# Patient Record
Sex: MALE | Race: WHITE | Employment: FULL TIME | URBAN - METROPOLITAN AREA
[De-identification: names, ages, dates, MRNs, and addresses within clinical notes are randomized per-mention and may not be internally consistent; named-entity substitution may affect disease eponyms.]

---

## 2018-02-02 ENCOUNTER — HOSPITAL ENCOUNTER (OUTPATIENT)
Dept: CT IMAGING | Age: 43
Discharge: HOME | End: 2018-02-02
Attending: UROLOGY
Payer: COMMERCIAL

## 2018-02-02 DIAGNOSIS — N20.0 KIDNEY STONE: ICD-10-CM

## 2018-02-02 PROCEDURE — 74178 CT ABD&PLV WO CNTR FLWD CNTR: CPT

## 2018-02-02 PROCEDURE — 74011636320 HC RX REV CODE- 636/320: Performed by: UROLOGY

## 2018-02-02 RX ADMIN — IOPAMIDOL 100 ML: 755 INJECTION, SOLUTION INTRAVENOUS at 16:00

## 2018-03-16 RX ORDER — CEFAZOLIN SODIUM 2 G/50ML
2 SOLUTION INTRAVENOUS ONCE
Status: CANCELLED | OUTPATIENT
Start: 2018-03-16 | End: 2018-03-16

## 2018-03-18 ENCOUNTER — ANESTHESIA EVENT (OUTPATIENT)
Dept: SURGERY | Age: 43
End: 2018-03-18
Payer: COMMERCIAL

## 2018-03-19 ENCOUNTER — APPOINTMENT (OUTPATIENT)
Dept: GENERAL RADIOLOGY | Age: 43
End: 2018-03-19
Attending: UROLOGY
Payer: COMMERCIAL

## 2018-03-19 ENCOUNTER — HOSPITAL ENCOUNTER (OUTPATIENT)
Age: 43
Setting detail: OUTPATIENT SURGERY
Discharge: HOME | End: 2018-03-19
Attending: UROLOGY | Admitting: UROLOGY
Payer: COMMERCIAL

## 2018-03-19 ENCOUNTER — ANESTHESIA (OUTPATIENT)
Dept: SURGERY | Age: 43
End: 2018-03-19
Payer: COMMERCIAL

## 2018-03-19 VITALS
HEART RATE: 78 BPM | RESPIRATION RATE: 16 BRPM | WEIGHT: 290.2 LBS | HEIGHT: 73 IN | OXYGEN SATURATION: 92 % | BODY MASS INDEX: 38.46 KG/M2 | TEMPERATURE: 97.3 F | DIASTOLIC BLOOD PRESSURE: 85 MMHG | SYSTOLIC BLOOD PRESSURE: 125 MMHG

## 2018-03-19 PROCEDURE — C1769 GUIDE WIRE: HCPCS | Performed by: UROLOGY

## 2018-03-19 PROCEDURE — C1758 CATHETER, URETERAL: HCPCS | Performed by: UROLOGY

## 2018-03-19 PROCEDURE — 77030013079 HC BLNKT BAIR HGGR 3M -A: Performed by: ANESTHESIOLOGY

## 2018-03-19 PROCEDURE — 76010000161 HC OR TIME 1 TO 1.5 HR INTENSV-TIER 1: Performed by: UROLOGY

## 2018-03-19 PROCEDURE — 77030012961 HC IRR KT CYSTO/TUR ICUM -A: Performed by: UROLOGY

## 2018-03-19 PROCEDURE — 74011250637 HC RX REV CODE- 250/637

## 2018-03-19 PROCEDURE — 74018 RADEX ABDOMEN 1 VIEW: CPT

## 2018-03-19 PROCEDURE — C2617 STENT, NON-COR, TEM W/O DEL: HCPCS | Performed by: UROLOGY

## 2018-03-19 PROCEDURE — 74011250636 HC RX REV CODE- 250/636

## 2018-03-19 PROCEDURE — 77030010509 HC AIRWY LMA MSK TELE -A: Performed by: ANESTHESIOLOGY

## 2018-03-19 PROCEDURE — 77030018846 HC SOL IRR STRL H20 ICUM -A: Performed by: UROLOGY

## 2018-03-19 PROCEDURE — 74011250636 HC RX REV CODE- 250/636: Performed by: NURSE ANESTHETIST, CERTIFIED REGISTERED

## 2018-03-19 PROCEDURE — 77030018836 HC SOL IRR NACL ICUM -A: Performed by: UROLOGY

## 2018-03-19 PROCEDURE — 74011000250 HC RX REV CODE- 250

## 2018-03-19 PROCEDURE — 77030020782 HC GWN BAIR PAWS FLX 3M -B: Performed by: UROLOGY

## 2018-03-19 PROCEDURE — C1894 INTRO/SHEATH, NON-LASER: HCPCS | Performed by: UROLOGY

## 2018-03-19 PROCEDURE — 82360 CALCULUS ASSAY QUANT: CPT | Performed by: UROLOGY

## 2018-03-19 PROCEDURE — 76060000033 HC ANESTHESIA 1 TO 1.5 HR: Performed by: UROLOGY

## 2018-03-19 PROCEDURE — 76210000021 HC REC RM PH II 0.5 TO 1 HR: Performed by: UROLOGY

## 2018-03-19 PROCEDURE — 76210000006 HC OR PH I REC 0.5 TO 1 HR: Performed by: UROLOGY

## 2018-03-19 DEVICE — URETERAL STENT
Type: IMPLANTABLE DEVICE | Site: URETER | Status: FUNCTIONAL
Brand: CONTOUR™

## 2018-03-19 RX ORDER — FAMOTIDINE 20 MG/1
TABLET, FILM COATED ORAL
Status: COMPLETED
Start: 2018-03-19 | End: 2018-03-19

## 2018-03-19 RX ORDER — OXYCODONE AND ACETAMINOPHEN 5; 325 MG/1; MG/1
1-2 TABLET ORAL
Qty: 31 TAB | Refills: 0 | Status: SHIPPED | OUTPATIENT
Start: 2018-03-19 | End: 2018-05-03

## 2018-03-19 RX ORDER — INSULIN LISPRO 100 [IU]/ML
INJECTION, SOLUTION INTRAVENOUS; SUBCUTANEOUS ONCE
Status: DISCONTINUED | OUTPATIENT
Start: 2018-03-19 | End: 2018-03-19 | Stop reason: HOSPADM

## 2018-03-19 RX ORDER — LIDOCAINE HYDROCHLORIDE 20 MG/ML
INJECTION, SOLUTION EPIDURAL; INFILTRATION; INTRACAUDAL; PERINEURAL AS NEEDED
Status: DISCONTINUED | OUTPATIENT
Start: 2018-03-19 | End: 2018-03-19 | Stop reason: HOSPADM

## 2018-03-19 RX ORDER — SODIUM CHLORIDE, SODIUM LACTATE, POTASSIUM CHLORIDE, CALCIUM CHLORIDE 600; 310; 30; 20 MG/100ML; MG/100ML; MG/100ML; MG/100ML
75 INJECTION, SOLUTION INTRAVENOUS CONTINUOUS
Status: DISCONTINUED | OUTPATIENT
Start: 2018-03-19 | End: 2018-03-19 | Stop reason: HOSPADM

## 2018-03-19 RX ORDER — LIDOCAINE HYDROCHLORIDE 10 MG/ML
0.1 INJECTION, SOLUTION EPIDURAL; INFILTRATION; INTRACAUDAL; PERINEURAL AS NEEDED
Status: DISCONTINUED | OUTPATIENT
Start: 2018-03-19 | End: 2018-03-19 | Stop reason: HOSPADM

## 2018-03-19 RX ORDER — SODIUM CHLORIDE 0.9 % (FLUSH) 0.9 %
5-10 SYRINGE (ML) INJECTION AS NEEDED
Status: DISCONTINUED | OUTPATIENT
Start: 2018-03-19 | End: 2018-03-19 | Stop reason: HOSPADM

## 2018-03-19 RX ORDER — FENTANYL CITRATE 50 UG/ML
INJECTION, SOLUTION INTRAMUSCULAR; INTRAVENOUS AS NEEDED
Status: DISCONTINUED | OUTPATIENT
Start: 2018-03-19 | End: 2018-03-19 | Stop reason: HOSPADM

## 2018-03-19 RX ORDER — MAGNESIUM SULFATE 100 %
4 CRYSTALS MISCELLANEOUS AS NEEDED
Status: DISCONTINUED | OUTPATIENT
Start: 2018-03-19 | End: 2018-03-19 | Stop reason: HOSPADM

## 2018-03-19 RX ORDER — DEXTROSE 50 % IN WATER (D50W) INTRAVENOUS SYRINGE
25-50 AS NEEDED
Status: DISCONTINUED | OUTPATIENT
Start: 2018-03-19 | End: 2018-03-19 | Stop reason: HOSPADM

## 2018-03-19 RX ORDER — PROPOFOL 10 MG/ML
INJECTION, EMULSION INTRAVENOUS AS NEEDED
Status: DISCONTINUED | OUTPATIENT
Start: 2018-03-19 | End: 2018-03-19 | Stop reason: HOSPADM

## 2018-03-19 RX ORDER — ONDANSETRON 2 MG/ML
4 INJECTION INTRAMUSCULAR; INTRAVENOUS ONCE
Status: DISCONTINUED | OUTPATIENT
Start: 2018-03-19 | End: 2018-03-19 | Stop reason: HOSPADM

## 2018-03-19 RX ORDER — CIPROFLOXACIN 2 MG/ML
INJECTION, SOLUTION INTRAVENOUS AS NEEDED
Status: DISCONTINUED | OUTPATIENT
Start: 2018-03-19 | End: 2018-03-19 | Stop reason: HOSPADM

## 2018-03-19 RX ORDER — FENTANYL CITRATE 50 UG/ML
50 INJECTION, SOLUTION INTRAMUSCULAR; INTRAVENOUS
Status: DISCONTINUED | OUTPATIENT
Start: 2018-03-19 | End: 2018-03-19 | Stop reason: HOSPADM

## 2018-03-19 RX ORDER — SODIUM CHLORIDE 0.9 % (FLUSH) 0.9 %
5-10 SYRINGE (ML) INJECTION EVERY 8 HOURS
Status: DISCONTINUED | OUTPATIENT
Start: 2018-03-19 | End: 2018-03-19 | Stop reason: HOSPADM

## 2018-03-19 RX ORDER — MIDAZOLAM HYDROCHLORIDE 1 MG/ML
INJECTION, SOLUTION INTRAMUSCULAR; INTRAVENOUS AS NEEDED
Status: DISCONTINUED | OUTPATIENT
Start: 2018-03-19 | End: 2018-03-19 | Stop reason: HOSPADM

## 2018-03-19 RX ORDER — FAMOTIDINE 20 MG/1
20 TABLET, FILM COATED ORAL ONCE
Status: COMPLETED | OUTPATIENT
Start: 2018-03-19 | End: 2018-03-19

## 2018-03-19 RX ORDER — DOCUSATE SODIUM 100 MG/1
100 CAPSULE, LIQUID FILLED ORAL 2 TIMES DAILY
Qty: 40 CAP | Refills: 0 | Status: SHIPPED | OUTPATIENT
Start: 2018-03-19 | End: 2018-04-08

## 2018-03-19 RX ORDER — DEXAMETHASONE SODIUM PHOSPHATE 4 MG/ML
INJECTION, SOLUTION INTRA-ARTICULAR; INTRALESIONAL; INTRAMUSCULAR; INTRAVENOUS; SOFT TISSUE AS NEEDED
Status: DISCONTINUED | OUTPATIENT
Start: 2018-03-19 | End: 2018-03-19 | Stop reason: HOSPADM

## 2018-03-19 RX ORDER — ONDANSETRON 2 MG/ML
INJECTION INTRAMUSCULAR; INTRAVENOUS AS NEEDED
Status: DISCONTINUED | OUTPATIENT
Start: 2018-03-19 | End: 2018-03-19 | Stop reason: HOSPADM

## 2018-03-19 RX ORDER — CIPROFLOXACIN 2 MG/ML
400 INJECTION, SOLUTION INTRAVENOUS ONCE
Status: DISCONTINUED | OUTPATIENT
Start: 2018-03-19 | End: 2018-03-19 | Stop reason: HOSPADM

## 2018-03-19 RX ADMIN — FAMOTIDINE 20 MG: 20 TABLET, FILM COATED ORAL at 12:03

## 2018-03-19 RX ADMIN — CIPROFLOXACIN 400 MG: 2 INJECTION, SOLUTION INTRAVENOUS at 13:19

## 2018-03-19 RX ADMIN — ONDANSETRON 4 MG: 2 INJECTION INTRAMUSCULAR; INTRAVENOUS at 13:22

## 2018-03-19 RX ADMIN — LIDOCAINE HYDROCHLORIDE 40 MG: 20 INJECTION, SOLUTION EPIDURAL; INFILTRATION; INTRACAUDAL; PERINEURAL at 13:22

## 2018-03-19 RX ADMIN — FENTANYL CITRATE 50 MCG: 50 INJECTION, SOLUTION INTRAMUSCULAR; INTRAVENOUS at 13:50

## 2018-03-19 RX ADMIN — FENTANYL CITRATE 50 MCG: 50 INJECTION, SOLUTION INTRAMUSCULAR; INTRAVENOUS at 13:19

## 2018-03-19 RX ADMIN — DEXAMETHASONE SODIUM PHOSPHATE 4 MG: 4 INJECTION, SOLUTION INTRA-ARTICULAR; INTRALESIONAL; INTRAMUSCULAR; INTRAVENOUS; SOFT TISSUE at 13:22

## 2018-03-19 RX ADMIN — MIDAZOLAM HYDROCHLORIDE 2 MG: 1 INJECTION, SOLUTION INTRAMUSCULAR; INTRAVENOUS at 13:19

## 2018-03-19 RX ADMIN — PROPOFOL 200 MG: 10 INJECTION, EMULSION INTRAVENOUS at 13:22

## 2018-03-19 RX ADMIN — FENTANYL CITRATE 100 MCG: 50 INJECTION, SOLUTION INTRAMUSCULAR; INTRAVENOUS at 13:57

## 2018-03-19 RX ADMIN — SODIUM CHLORIDE, SODIUM LACTATE, POTASSIUM CHLORIDE, AND CALCIUM CHLORIDE 75 ML/HR: 600; 310; 30; 20 INJECTION, SOLUTION INTRAVENOUS at 12:03

## 2018-03-19 NOTE — ANESTHESIA POSTPROCEDURE EVALUATION
Post-Anesthesia Evaluation and Assessment    Patient: Mya Bowie MRN: [id number]  SSN: [id number]    YOB: 1975  Age: 38 y.o. Sex: male     VS from flow sheet    Cardiovascular Function/Vital Signs  Visit Vitals    /74    Pulse 72    Temp 36.5 °C (97.7 °F)    Resp 19    Ht 6' 1\" (1.854 m)    Wt 131.6 kg (290 lb 3.2 oz)    SpO2 95%    BMI 38.29 kg/m2       Patient is status post general anesthesia for Procedure(s):  CYSTOSCOPY/RIGHT URETEROSCOPY/100W HOLMIUM LASER LITHOTRIPSY/URETERAL STENT/C-ARM. Nausea/Vomiting: None    Postoperative hydration reviewed and adequate. Pain:  Pain Scale 1: Numeric (0 - 10) (03/19/18 1449)  Pain Intensity 1: 0 (03/19/18 1449)   Managed    Neurological Status:   Neuro (WDL): Within Defined Limits (03/19/18 1440)   At baseline    Mental Status and Level of Consciousness: Arousable    Pulmonary Status:   O2 Device: Room air (03/19/18 1447)   Adequate oxygenation and airway patent    Complications related to anesthesia: None    Post-anesthesia assessment completed.  No concerns    Signed By: Afshan Merida MD     March 19, 2018

## 2018-03-19 NOTE — DISCHARGE INSTRUCTIONS
Discharge Instructions      Patient: Audrey Red               Sex: male          DOA: 3/19/2018      YOB: 1975      Age:  38 y.o.        LOS:  LOS: 0 days     ACUTE DIAGNOSES:  Kidney stones [N20.0]    CHRONIC MEDICAL DIAGNOSES:  Problem List as of 3/19/2018  Date Reviewed: 3/19/2018          Codes Class Noted - Resolved    Kidney stone ICD-10-CM: N20.0  ICD-9-CM: 592.0  Unknown - Present    Overview Signed 3/9/2017 12:23 PM by Fina Brady MD     3/2017  24 hr urine   Low:  Vol, and citrate   High uric and sodium >>> calcium                   PROCEDURE:  Right Ureteroscopy, right ureteral stent placement    Instructions:  Please drink plenty of fluids to promote clear yellow urine. You may have blood in your urine which is normal.  A small amount of blood can cause all of the urine to look red. Please contact the office is you are passing large clots as this may be a sign of more serious bleeding. You have a stent in your ureter. This may cause flank or lower abdominal discomfort which is normal. You can take flomax to reduce this discomfort. You may resume all regular activity after 24 hours. You may remove your stent by gently pulling on the string 1 week after surgery. DISCHARGE MEDICATIONS:     · It is important that you take the medication exactly as they are prescribed. · Keep your medication in the bottles provided by the pharmacist and keep a list of the medication names, dosages, and times to be taken in your wallet. · Do not take other medications without consulting your doctor.        DIET:  Regular Diet    ACTIVITY: Activity as tolerated and no driving for today    ADDITIONAL INFORMATION: If you experience any of the following symptoms then please call your primary care physician or return to the emergency room if you cannot get hold of your doctor: Fever, chills, nausea, vomiting, diarrhea, change in mentation, falling, bleeding, shortness of breath. FOLLOW UP CARE:  Dr. Jamaica Son will see you in 6  Weeks in follow up. Office info:  [place]: [address]: [phone]           Information obtained by :  I understand that if any problems occur once I am at home I am to contact my physician. I understand and acknowledge receipt of the instructions indicated above. Physician's or R.N.'s Signature                                                                  Date/Time                                                                                                                                              Patient or Representative Signature                                                          Date/Time   Laxative, Stool Softeners (Doculax, Colace, Colace Clear, DSS) - (By mouth)   Why this medicine is used:   Treats constipation by helping you have a bowel movement. Contact a nurse or doctor right away if you have:  · Dark urine or pale stools  · Vomiting, loss of appetite, stomach pain  · Yellow skin or eyes     Common side effects:  · Nausea, diarrhea, stomach cramps, bitter taste in mouth  © 2017 [address] [employer] [address] [employer] [address] is for End User's use only and may not be sold, redistributed or otherwise used for commercial purposes. Narcotic-Analgesic/Acetaminophen (Percocet, Norco, Lorcet HD, Lortab 10/325) - (By mouth)   Why this medicine is used:   Relieves pain.   Contact a nurse or doctor right away if you have:  · Extreme weakness, shallow breathing, slow heartbeat  · Severe confusion, lightheadedness, dizziness, fainting  · Yellow skin or eyes, dark urine or pale stools  · Severe constipation, severe stomach pain, nausea, vomiting, loss of appetite  · Sweating or cold, clammy skin     Common side effects:  · Mild constipation, nausea, vomiting  · Sleepiness, tiredness  · Itching, rash  © 2017 [address] Information is for End User's use only and may not be sold, redistributed or otherwise used for commercial purposes. DISCHARGE SUMMARY from Nurse    PATIENT INSTRUCTIONS:    After general anesthesia or intravenous sedation, for 24 hours or while taking prescription Narcotics:  · Limit your activities  · Do not drive and operate hazardous machinery  · Do not make important personal or business decisions  · Do  not drink alcoholic beverages  · If you have not urinated within 8 hours after discharge, please contact your surgeon on call. Report the following to your surgeon:  · Excessive pain, swelling, redness or odor of or around the surgical area  · Temperature over 100.5  · Nausea and vomiting lasting longer than 4 hours or if unable to take medications  · Any signs of decreased circulation or nerve impairment to extremity: change in color, persistent  numbness, tingling, coldness or increase pain  · Any questions  *  Please give a list of your current medications to your Primary Care Provider. *  Please update this list whenever your medications are discontinued, doses are      changed, or new medications (including over-the-counter products) are added. *  Please carry medication information at all times in case of emergency situations. These are general instructions for a healthy lifestyle:    No smoking/ No tobacco products/ Avoid exposure to second hand smoke  Surgeon General's Warning:  Quitting smoking now greatly reduces serious risk to your health.     Obesity, smoking, and sedentary lifestyle greatly increases your risk for illness    A healthy diet, regular physical exercise & weight monitoring are important for maintaining a healthy lifestyle    You may be retaining fluid if you have a history of heart failure or if you experience any of the following symptoms:  Weight gain of 3 pounds or more overnight or 5 pounds in a week, increased swelling in our hands or feet or shortness of breath while lying flat in bed. Please call your doctor as soon as you notice any of these symptoms; do not wait until your next office visit. Recognize signs and symptoms of STROKE:    F-face looks uneven    A-arms unable to move or move unevenly    S-speech slurred or non-existent    T-time-call 911 as soon as signs and symptoms begin-DO NOT go       Back to bed or wait to see if you get better-TIME IS BRAIN. Warning Signs of HEART ATTACK     Call 911 if you have these symptoms:   Chest discomfort. Most heart attacks involve discomfort in the center of the chest that lasts more than a few minutes, or that goes away and comes back. It can feel like uncomfortable pressure, squeezing, fullness, or pain.  Discomfort in other areas of the upper body. Symptoms can include pain or discomfort in one or both arms, the back, neck, jaw, or stomach.  Shortness of breath with or without chest discomfort.  Other signs may include breaking out in a cold sweat, nausea, or lightheadedness. Don't wait more than five minutes to call 911 - MINUTES MATTER! Fast action can save your life. Calling 911 is almost always the fastest way to get lifesaving treatment. Emergency Medical Services staff can begin treatment when they arrive -- up to an hour sooner than if someone gets to the hospital by car. The discharge information has been reviewed with the patient and spouse. The patient and spouse verbalized understanding. Discharge medications reviewed with the patient and spouse and appropriate educational materials and side effects teaching were provided.   ___________________________________________________________________________________________________________________________________

## 2018-03-19 NOTE — ANESTHESIA PREPROCEDURE EVALUATION
Anesthetic History   No history of anesthetic complications            Review of Systems / Medical History  Patient summary reviewed and pertinent labs reviewed    Pulmonary  Within defined limits      Sleep apnea           Neuro/Psych   Within defined limits           Cardiovascular  Within defined limits                     GI/Hepatic/Renal                Endo/Other        Morbid obesity     Other Findings   Comments: Documentation of current medication  Current medications obtained, documented and obtained? YES      Risk Factors for Postoperative nausea/vomiting:       History of postoperative nausea/vomiting? NO       Female? NO       Motion sickness? NO       Intended opioid administration for postoperative analgesia? YES      Smoking Abstinence:  Current Smoker? NO  Elective Surgery? YES  Seen preoperatively by anesthesiologist or proxy prior to day of surgery? YES  Pt abstained from smoking 24 hours prior to anesthesia?  N/A    Preventive care/screening for High Blood Pressure:  Aged 18 years and older: YES  Screened for high blood pressure: YES  Patients with high blood pressure referred to primary care provider   for BP management: YES                 Physical Exam    Airway  Mallampati: III  TM Distance: 4 - 6 cm  Neck ROM: decreased range of motion   Mouth opening: Diminished (comment)     Cardiovascular    Rhythm: irregular  Rate: normal         Dental    Dentition: Poor dentition     Pulmonary  Breath sounds clear to auscultation               Abdominal  GI exam deferred       Other Findings            Anesthetic Plan    ASA: 3  Anesthesia type: general          Induction: Intravenous  Anesthetic plan and risks discussed with: Patient

## 2018-03-19 NOTE — IP AVS SNAPSHOT
[address] [place] [id number] 
[phone] Patient: Sabino Correia MRN: [id number] POX:[birth year] About your hospitalization You were admitted on:  March 19, 2018 You last received care in the:  [facility] PACU You were discharged on:  March 19, 2018 Why you were hospitalized Your primary diagnosis was:  Not on File Your diagnoses also included:  Kidney Stone Follow-up Information Follow up With Details Comments Contact Info Obie Case MD   [address] [facility] 
[phone] Carlos Raman MD  Follow up in 6 weeks [address] [facility] [address] [facility] 
[phone] Discharge Orders None A check sabino indicates which time of day the medication should be taken. My Medications START taking these medications Instructions Each Dose to Equal  
 Morning Noon Evening Bedtime  
 docusate sodium 100 mg capsule Commonly known as:  Gorham Zuniga Your last dose was: Your next dose is: Take 1 Cap by mouth two (2) times a day for 20 days. 100 mg  
    
   
   
   
  
 oxyCODONE-acetaminophen 5-325 mg per tablet Commonly known as:  PERCOCET Your last dose was: Your next dose is: Take 1-2 Tabs by mouth every four (4) hours as needed for Pain. Max Daily Amount: 12 Tabs. 1-2 Tab CONTINUE taking these medications Instructions Each Dose to Equal  
 Morning Noon Evening Bedtime  
 tamsulosin 0.4 mg capsule Commonly known as:  FLOMAX Your last dose was: Your next dose is: Take 1 Cap by mouth daily (after dinner). 0.4 mg Where to Get Your Medications Information on where to get these meds will be given to you by the nurse or doctor. ! Ask your nurse or doctor about these medications docusate sodium 100 mg capsule  
 oxyCODONE-acetaminophen 5-325 mg per tablet Discharge Instructions Discharge Instructions Patient: Mary Issa               Sex: male          DOA: 3/19/2018 YOB: 1975      Age:  38 y.o.        LOS:  LOS: 0 days ACUTE DIAGNOSES: 
Kidney stones [N20.0] CHRONIC MEDICAL DIAGNOSES: 
Problem List as of 3/19/2018  Date Reviewed: 3/19/2018 Codes Class Noted - Resolved Kidney stone ICD-10-CM: N20.0 ICD-9-CM: 592.0  Unknown - Present Overview Signed 3/9/2017 12:23 PM by Rick Jaeger MD  
  3/2017 
24 hr urine Low:  Vol, and citrate High uric and sodium >>> calcium PROCEDURE: 
Right Ureteroscopy, right ureteral stent placement Instructions: 
Please drink plenty of fluids to promote clear yellow urine. You may have blood in your urine which is normal.  A small amount of blood can cause all of the urine to look red. Please contact the office is you are passing large clots as this may be a sign of more serious bleeding. You have a stent in your ureter. This may cause flank or lower abdominal discomfort which is normal. You can take flomax to reduce this discomfort. You may resume all regular activity after 24 hours. You may remove your stent by gently pulling on the string 1 week after surgery. DISCHARGE MEDICATIONS:  
 
· It is important that you take the medication exactly as they are prescribed. · Keep your medication in the bottles provided by the pharmacist and keep a list of the medication names, dosages, and times to be taken in your wallet. · Do not take other medications without consulting your doctor. DIET:  Regular Diet ACTIVITY: Activity as tolerated and no driving for today ADDITIONAL INFORMATION: If you experience any of the following symptoms then please call your primary care physician or return to the emergency room if you cannot get hold of your doctor: Fever, chills, nausea, vomiting, diarrhea, change in mentation, falling, bleeding, shortness of breath. FOLLOW UP CARE: 
Dr. Allie Yip will see you in 6  Weeks in follow up. Office info: 
[facility]: [phone] [place]: [phone] Information obtained by : 
I understand that if any problems occur once I am at home I am to contact my physician. I understand and acknowledge receipt of the instructions indicated above. Physician's or R.N.'s Signature                                                                  Date/Time Patient or Representative Signature                                                          Date/Time Laxative, Stool Softeners (Doculax, Colace, Colace Clear, DSS) - (By mouth) Why this medicine is used:  
Treats constipation by helping you have a bowel movement. Contact a nurse or doctor right away if you have: · Dark urine or pale stools · Vomiting, loss of appetite, stomach pain · Yellow skin or eyes Common side effects: 
· Nausea, diarrhea, stomach cramps, bitter taste in mouth © 2017 [facility] Information is for End User's use only and may not be sold, redistributed or otherwise used for commercial purposes. Narcotic-Analgesic/Acetaminophen (Percocet, Norco, Lorcet HD, Lortab 10/325) - (By mouth) Why this medicine is used:  
Relieves pain. Contact a nurse or doctor right away if you have: 
· Extreme weakness, shallow breathing, slow heartbeat · Severe confusion, lightheadedness, dizziness, fainting · Yellow skin or eyes, dark urine or pale stools · Severe constipation, severe stomach pain, nausea, vomiting, loss of appetite · Sweating or cold, clammy skin Common side effects: · Mild constipation, nausea, vomiting · Sleepiness, tiredness · Itching, rash © 2017 [address] Information is for End User's use only and may not be sold, redistributed or otherwise used for commercial purposes. DISCHARGE SUMMARY from Nurse PATIENT INSTRUCTIONS: 
 
 
F-face looks uneven A-arms unable to move or move unevenly S-speech slurred or non-existent T-time-call 911 as soon as signs and symptoms begin-DO NOT go Back to bed or wait to see if you get better-TIME IS BRAIN. Warning Signs of HEART ATTACK Call 911 if you have these symptoms: 
? Chest discomfort. Most heart attacks involve discomfort in the center of the chest that lasts more than a few minutes, or that goes away and comes back. It can feel like uncomfortable pressure, squeezing, fullness, or pain. ? Discomfort in other areas of the upper body. Symptoms can include pain or discomfort in one or both arms, the back, neck, jaw, or stomach. ? Shortness of breath with or without chest discomfort. ? Other signs may include breaking out in a cold sweat, nausea, or lightheadedness. Don't wait more than five minutes to call [address]! Fast action can save your life. Calling 911 is almost always the fastest way to get lifesaving treatment. Emergency Medical Services staff can begin treatment when they arrive  up to an hour sooner than if someone gets to the hospital by car. The discharge information has been reviewed with the patient and spouse. The patient and spouse verbalized understanding. Discharge medications reviewed with the patient and spouse and appropriate educational materials and side effects teaching were provided.  
___________________________________________________________________________ ________________________________________________________ Introducing [facility]! [employer] introduces [employer] patient portal. Now you can access parts of your medical record, email your doctor's office, and request medication refills online. 1. In your internet browser, go to [email or web address] 2. Click on the First Time User? Click Here link in the Sign In box. You will see the New Member Sign Up page. 3. Enter your [employer] Access Code exactly as it appears below. You will not need to use this code after youve completed the sign-up process. If you do not sign up before the expiration date, you must request a new code. · [employer] Access Code: [email or web address] Expires: 4/25/2018  5:07 PM 
 
4. Enter the last four digits of your Social Security Number (xxxx) and Date of Birth (mm/dd/yyyy) as indicated and click Submit. You will be taken to the next sign-up page. 5. Create a [employer]abelverse ID. This will be your [employer]verse login ID and cannot be changed, so think of one that is secure and easy to remember. 6. Create a [employer] password. You can change your password at any time. 7. Enter your Password Reset Question and Answer. This can be used at a later time if you forget your password. 8. Enter your e-mail address. You will receive e-mail notification when new information is available in [address] 9. Click Sign Up. You can now view and download portions of your medical record. 10. Click the Download Summary menu link to download a portable copy of your medical information. If you have questions, please visit the Frequently Asked Questions section of the [employer] website. Remember, [employer]verse is NOT to be used for urgent needs. For medical emergencies, dial 911. Now available from your iPhone and Android! Unresulted Labs-Please follow up with your PCP about these lab tests Order Current Status [place] XR TECHNOLOGIST SERVICE In process XR ABD (STEPHANIE) In process Providers Seen During Your Hospitalization Provider Specialty Primary office phone Flip Berg MD Urology [phone] Your Primary Care Physician (PCP) Primary Care Physician Office Phone Office Fax ValeriaHeather Casetllon [phone] You are allergic to the following Allergen Reactions Penicillins Other (comments) Recent Documentation Height Weight BMI Smoking Status 1.854 m 131.6 kg 38.29 kg/m2 Never Smoker Emergency Contacts Name Discharge Info Relation Home Work Mobile Samantha Laguerre CAREGIVER [3] Spouse [3]  [phone] Patient Belongings The following personal items are in your possession at time of discharge: 
  Dental Appliances: None  Visual Aid: Glasses          Jewelry: None  Clothing: Undergarments, Pants, Shirt, Footwear    Other Valuables: Eyeglasses Please provide this summary of care documentation to your next provider. Signatures-by signing, you are acknowledging that this After Visit Summary has been reviewed with you and you have received a copy. Patient Signature:  ____________________________________________________________ Date:  ____________________________________________________________  
  
Keren Millan Provider Signature:  ____________________________________________________________ Date:  ____________________________________________________________

## 2018-03-19 NOTE — H&P
ASSESSMENT:   1. Right urolithiasis, gross hematuria    PLAN:    1. Right ureteroscopy, laser lithotripsy, right ureteral stent placement. CC: right urolithiasis    HISTORY OF PRESENT ILLNESS:  Harvey Patient is a 38 y.o. male who is seen preop. History of right urolithiasis. Noted on gross hematuria work up. No fevers, chills, nausea, vomiting. No new issues. AUA Symptom Score 1/8/2018   Over the past month how often have you had the sensation that your bladder was not completely empty after you finished urinating? 0   Over the past month, how often have had to urinate again less than 2 hours after you last finished urinating? 1   Over the past month, how often have you found you stopped and started again several times when you urinated? 0   Over the past month, how often have you found it difficult to postpone urination? 0   Over the past month, how often have you had a weak urinary stream? 0   Over the past month, how often have you had to push or strain to begin urinating? 0   Over the past month, how many times did you most typically get up to urinate from the time you went to bed at night until the time you got up in the morning? 1   AUA Score 2   If you were to spend the rest of your life with your urinary condition the way it is now, how would you feel about that? Delighted         Past Medical History:   Diagnosis Date    Kidney stone     Sleep apnea     cpap       Past Surgical History:   Procedure Laterality Date    HX VASECTOMY         Social History   Substance Use Topics    Smoking status: Never Smoker    Smokeless tobacco: Never Used    Alcohol use No       Allergies   Allergen Reactions    Penicillins Other (comments)       History reviewed. No pertinent family history.     Current Facility-Administered Medications   Medication Dose Route Frequency Provider Last Rate Last Dose    lidocaine (PF) (XYLOCAINE) 10 mg/mL (1 %) injection 0.1 mL  0.1 mL SubCUTAneous PRN Naomyjolly Nair CRNA        lactated Ringers infusion  75 mL/hr IntraVENous CONTINUOUS Naomy Nair CRNA 75 mL/hr at 03/19/18 1203 75 mL/hr at 03/19/18 1203    sodium chloride (NS) flush 5-10 mL  5-10 mL IntraVENous Q8H Naomy Nair CRNA        sodium chloride (NS) flush 5-10 mL  5-10 mL IntraVENous PRN Naomyjolly Nair CRNA        insulin lispro (HUMALOG) injection   SubCUTAneous ONCE Naomy Joanie, JOSI        ciprofloxacin (CIPRO) 400 mg IVPB (premix)  400 mg IntraVENous ONCE Lainey Stockton MD           Review of Systems  Pertinent findings noted in hpi. Otherwise negative review. PHYSICAL EXAMINATION:   Visit Vitals    /83    Pulse 89    Temp 98.4 °F (36.9 °C)    Resp 18    Ht 6' 1\" (1.854 m)    Wt 290 lb 3.2 oz (131.6 kg)    SpO2 96%    BMI 38.29 kg/m2     Constitutional: WDWN, Pleasant and appropriate affect, No acute distress. CV:  No peripheral swelling noted  Respiratory: No respiratory distress or difficulties  Abdomen:  No abdominal masses or tenderness. No CVA tenderness. No inguinal hernias noted. Skin: No jaundice. Neuro/Psych:  Alert and oriented x 3, affect appropriate. Lymphatic:   No enlarged inguinal lymph nodes.         REVIEW OF LABS AND IMAGING:    Results for orders placed or performed in visit on 03/05/18   URINE C&S PRE-OP   Result Value Ref Range    FINAL REPORT Microbiology results    AMB POC URINALYSIS DIP STICK AUTO W/O MICRO   Result Value Ref Range    Color (UA POC) Yellow     Clarity (UA POC) Clear     Glucose (UA POC) Negative Negative    Bilirubin (UA POC) Negative Negative    Ketones (UA POC) Trace Negative    Specific gravity (UA POC) 1.030 1.001 - 1.035    Blood (UA POC) 3+ Negative    pH (UA POC) 5.5 4.6 - 8.0    Protein (UA POC) Negative Negative    Urobilinogen (UA POC) 0.2 mg/dL 0.2 - 1    Nitrites (UA POC) Negative Negative    Leukocyte esterase (UA POC) Negative Negative       No results found for: PSA, PSA2, PSAR1, PSA1, PSAR2, PSA3, PSAR3, [id number], [id number], PSALT    CT Results:    Results from Hospital Encounter encounter on 02/02/18   CT UROGRAM W WO CONT   Narrative CT Urogram    Clinical information: Hematuria, gross     Comparison: None available. Technique: ? Axial CT of the abdomen and pelvis after administration of  intravenous contrast. Three-dimensional rendering and MIP reconstructions  performed on an independent workstation and submitted for evaluation. All CT  scans at this facility are performed using dose optimization technique as  appropriate to a performed exam, to include automated exposure control,  adjustment of the mA and/or kV according to patient size (including appropriate  matching for site-specific examinations), or use of iterative reconstruction  technique. Please note that lack of oral contrast limits interpretation of the  gastrointestinal tract and associated structures. Findings:       Urologic Findings:     Kidneys:  Symmetric nephrogram and excretion. Nonobstructive right intrarenal  nephroliths measure 2 x 5 mm medial interpolar, 6 x 10 mm posterior interpolar    Ureters: No hydroureteronephrosis. Please note that the distal ureters are not  opacified with contrast at the time of the scan. Urinary Bladder: Unremarkable for technique. Contrast pools at the dependent  urinary bladder. Non-Urologic Findings:     - Peritoneum: No pneumoperitoneum or ascites. - Liver: Hypodense parenchymal echotexture. - Bile ducts: No intrahepatic biliary dilatation. No extrahepatic biliary  dilatation.     - Gallbladder: Unremarkable for technique    - Pancreas: No pancreatic ductal dilatation. Unremarkable for technique    - Spleen: Unremarkable for technique    - Bowel: No pneumatosis or mesenteric venous gas. No obstruction. Appendix has  an unremarkable CT appearance for technique. Diverticulosis coli without  evidence of diverticulitis.       - Adrenals: Unremarkable for technique    - Retroperitoneal lymph nodes: No enlarged retroperitoneal lymph nodes evident  for technique. - Mesenteric lymph nodes: No enlarged mesenteric lymph nodes evident for  technique. - Osseous structures: Multilevel degenerative vertebral changes. - Lung bases: [Dependent atelectasis]. - Pelvic lymph nodes:  No enlarged pelvic lymph nodes evident for technique. - Vessels:  No significant calcific atherosclerosis of the abdominal aorta  noted. Focus of calcification at the posterior right common iliac artery. - Osseous structures:  Multilevel degenerative vertebral changes. - Other:    _______________          Impression Impression:     1. Nonobstructing nephroliths, otherwise no active or acute urinary system  finding to account for the patient's hematuria. 2. Right lower quadrant and left midabdomen mesenteric conspicuous lymph nodes  of unknown clinical significance. The lymph nodes here are not enlarged by  strict size criteria however they are conspicuous and larger than lymph nodes  elsewhere in the abdomen. The patient has an unremarkable appendix by CT  criteria. Unclear if the patient has a history of lymphoproliferative disorder  or findings that may suggest mesenteric adenitis. Please correlate clinically. 3. Hepatic steatosis. US Results:  No results found for this or any previous visit.       Theresa Cross MD   [facility]  [phone]

## 2018-03-19 NOTE — OP NOTES
Operative Note      Patient: Mya Bowie               Sex: male             MRN: [id number]      YOB: 1975      Age:  38 y.o. Preoperative Diagnosis: Kidney stones [N20.0]    Postoperative Diagnosis:  Kidney stones [N20.0]    Surgeon: Jl Stack    Anesthesia:  General    Procedure:    1) Cystoscopy  2) right Ureteroscopy  3) Laser lithotripsy  4) Basket removal of stone  5) right ureteral stent placement  6) < 1 hour physician fluoroscopy imaging interpretation time    Operative Indication: This is a 38 y.o. male with a history of right urolithiasis. Their stone burden included 10 mm lower pole and 5 mm midpole. After the risks, benefits, alternatives, and complications were discussed they present now for operative management. Procedure in Detail: The patient was brought to the operative suite. Anesthesia was induced and preoperative antibiotics were administered. They were then placed in the dorsal lithotomy position and their external genitalia was prepped and draped in the usual fashion. A surgical timeout was performed confirming the patient's name, date of birth, laterality, and antibiotics. All were in agreement. A 22 Amharic cystourethroscope was then inserted into the patients bladder. The urethra revealed no abnormalities. No bladder abnormalities were noted on cystoscopy. A sensor wire was then passed up the right ureteral orifice and up the kidney using fluoroscopic guidance. A semirigid ureteroscope was then passed up the right ureteral orifice and into the ureter. The ureter was then examined. The ureter was somewhat narrow. A amplatz superstiff wire was then passed through the ureteroscope and up to the kidney using fluoroscopic guidance. The ureteroscope was removed. A 11/13 fr access sheath was then passed over the superstiff wire and up to the kidney using fluroscopic guidance.   Due to narrowing within the ureter I was able to only the the sheath to the mid ureter. The inner stylet and wire were removed. A flexible ureteroscope was passed up to the kidney. Thorough pyeloscopy was performed. Stone burden was noted in the midpole approximately 5 mm and 10 mm in the lower pole. The laser was introduced into the kidney and the stone burden was laser fragmented. A basket was then used to remove the stone burden. The remainder of fragments were quite small and irrigated out of the calyx. The ureteroscope was used to examine the ureter in a retrograde fashion as the access sheath was removed. There was no evidence of ureteral perforation. A 6 fr x 28 cm stent was then placed in the standard fashion over our sensor wire. Excellent coil was noted in the kidney and bladder on fluoroscopy. The patient was then awoken and transferred to the recovery room in stable condition. Estimated Blood Loss: minimal                    Implants:   Implant Name Type Inv. Item Serial No.  Lot No. LRB No. Used Action   STENT URET FLX SFT [id number] -- CONTOUR PERCUFLEX - [id number]   STENT URET FLX SFT [id number] -- CONTOUR PERCUFLEX   [employer] [facility] [id number] Right 1 Implanted       Specimens:   ID Type Source Tests Collected by Time Destination   1 : right kidney stones Fresh Kidney, Right  Delonte Ojeda MD 3/19/2018  2:24 PM Pathology        Drains: None           Complications:  None           Counts: Sponge and needle counts were correct times two.     Delonte Ojeda MD  3/19/2018

## 2018-03-28 LAB
CA PHOS MFR STONE: 5 %
CALCIUM OXALATE DIHYDRATE MFR STONE IR: 10 %
COLOR STONE: NORMAL
COM MFR STONE: 85 %
COMMENT, 519994: NORMAL
COMPOSITION, 120440: NORMAL
DISCLAIMER, STO32L: NORMAL
NIDUS STONE QL: NORMAL
SIZE STONE: NORMAL MM
SURFACE CRYSTALS, 120439: NORMAL
WT STONE: 124 MG

## 2018-03-29 NOTE — PROGRESS NOTES
Please let pt know stone is calcium oxalate. Rec increasing uop 2.5 L/ day. Increase lemon juice intake to 4 oz per day to prevent kidney stones.     Darell Lockwood MD

## 2019-04-04 ENCOUNTER — CLINICAL SUPPORT (OUTPATIENT)
Dept: FAMILY MEDICINE CLINIC | Age: 44
End: 2019-04-04

## 2019-04-04 DIAGNOSIS — E66.9 OBESITY, UNSPECIFIED CLASSIFICATION, UNSPECIFIED OBESITY TYPE, UNSPECIFIED WHETHER SERIOUS COMORBIDITY PRESENT: Primary | ICD-10-CM

## 2019-04-04 NOTE — PROGRESS NOTES
Patient attended a Medically Supervised Weight Loss New Patient Orientation today where we discussed:  - New Direction Very Low Calorie Diet details  - Medical Supervision  - Nutrition education  - Cost of Meal Replacements  - Policies and compliance required for program enrollment. Patients initial consultation with physician is tentatively scheduled for:  Future Appointments   Date Time Provider Andrew Lopez   5/2/2019  4:00 PM MD Amador Her       Patient did not make appointment with [facility] provider after orientation. Patient is taking a new job and will call to schedule once job is established. Patient has 6 months to schedule or patient will need to attend orientation again.

## 2020-05-28 ENCOUNTER — HOSPITAL ENCOUNTER (OUTPATIENT)
Dept: GENERAL RADIOLOGY | Age: 45
Discharge: HOME | End: 2020-05-28
Payer: COMMERCIAL

## 2020-05-28 DIAGNOSIS — N20.0 KIDNEY STONE: ICD-10-CM

## 2020-05-28 PROCEDURE — 74018 RADEX ABDOMEN 1 VIEW: CPT

## 2020-06-04 ENCOUNTER — CLINICAL SUPPORT (OUTPATIENT)
Dept: SURGERY | Age: 45
End: 2020-06-04

## 2020-06-04 DIAGNOSIS — E66.9 OBESITY, UNSPECIFIED CLASSIFICATION, UNSPECIFIED OBESITY TYPE, UNSPECIFIED WHETHER SERIOUS COMORBIDITY PRESENT: Primary | ICD-10-CM

## 2020-06-04 NOTE — PROGRESS NOTES
Patient attended a Medically Supervised Weight Loss New Patient Orientation today where we discussed:  - New Direction Very Low Calorie Diet details  - Medical Supervision  - Nutrition education  - Cost of Meal Replacements  - Policies and compliance required for program enrollment. Patients initial consultation with provider is tentatively scheduled for:  Future Appointments   Date Time Provider Andrew Lopez   11/2/2020  3:30 PM Genaro Zamora NP [facility]       Patient attended orientation but did not stay to schedule initial consult. Reason: unknown Patient has 6 months from today to schedule initial consult or will need to repeat orientation.

## (undated) DEVICE — URETERAL ACCESS SHEATH SET: Brand: NAVIGATOR

## (undated) DEVICE — Z DUP USE 2565107 PACK SURG PROC LEG CYSTO T-DRAPE REINF TBL CVR HND TWL

## (undated) DEVICE — GDWIRE 3CM FLX-TIP 0.038X150CM -- BX/5 SENSOR

## (undated) DEVICE — OPEN-END FLEXI-TIP URETERAL CATHETER: Brand: FLEXI-TIP

## (undated) DEVICE — GAUZE SPONGES,16 PLY: Brand: CURITY

## (undated) DEVICE — GOWN,PREVENTION PLUS,XLN/XL,ST,24/CS: Brand: MEDLINE

## (undated) DEVICE — 3M™ BAIR PAWS FLEX™ WARMING GOWN, STANDARD, 20 PER CASE 81003: Brand: BAIR PAWS™

## (undated) DEVICE — GUIDEWIRE: Brand: AMPLATZ SUPER STIFF™

## (undated) DEVICE — STER SINGLE BASIN SET W/BOWLS: Brand: CARDINAL HEALTH

## (undated) DEVICE — TRAY PREP DRY W/ PREM GLV 2 APPL 6 SPNG 2 UNDPD 1 OVERWRAP

## (undated) DEVICE — Device

## (undated) DEVICE — BAG DRAINAGE CUST DISP

## (undated) DEVICE — LUB SURG MEDC STRL 2OZ TUBE MC -- MEDICHOICE

## (undated) DEVICE — KIT CLN UP BON SECOURS MARYV

## (undated) DEVICE — MEDI-VAC NON-CONDUCTIVE SUCTION TUBING: Brand: CARDINAL HEALTH

## (undated) DEVICE — (D)SYR 10ML 1/5ML GRAD NSAF -- PKGING CHANGE USE ITEM 338027

## (undated) DEVICE — SOLUTION IRRIG 3000ML 0.9% SOD CHL FLX CONT 0797208] ICU MEDICAL INC]

## (undated) DEVICE — DRAPE XR C ARM 41X74IN LF --

## (undated) DEVICE — SOLUTION IRRIG 1000ML H2O STRL BLT

## (undated) DEVICE — TUBING IRRIG L77IN DIA0.241IN L BOR FOR CYSTO W/ NVENT